# Patient Record
Sex: MALE | Race: OTHER | NOT HISPANIC OR LATINO | URBAN - METROPOLITAN AREA
[De-identification: names, ages, dates, MRNs, and addresses within clinical notes are randomized per-mention and may not be internally consistent; named-entity substitution may affect disease eponyms.]

---

## 2023-01-15 ENCOUNTER — EMERGENCY (EMERGENCY)
Facility: HOSPITAL | Age: 33
LOS: 1 days | Discharge: HOME | End: 2023-01-15
Attending: EMERGENCY MEDICINE | Admitting: EMERGENCY MEDICINE
Payer: MEDICAID

## 2023-01-15 VITALS
TEMPERATURE: 98 F | RESPIRATION RATE: 15 BRPM | HEART RATE: 102 BPM | SYSTOLIC BLOOD PRESSURE: 107 MMHG | DIASTOLIC BLOOD PRESSURE: 73 MMHG | OXYGEN SATURATION: 100 %

## 2023-01-15 PROCEDURE — 99284 EMERGENCY DEPT VISIT MOD MDM: CPT

## 2023-01-15 RX ORDER — FAMOTIDINE 10 MG/ML
20 INJECTION INTRAVENOUS DAILY
Refills: 0 | Status: DISCONTINUED | OUTPATIENT
Start: 2023-01-15 | End: 2023-01-18

## 2023-01-15 RX ORDER — DIPHENHYDRAMINE HCL 50 MG
50 CAPSULE ORAL ONCE
Refills: 0 | Status: COMPLETED | OUTPATIENT
Start: 2023-01-15 | End: 2023-01-15

## 2023-01-15 RX ADMIN — FAMOTIDINE 20 MILLIGRAM(S): 10 INJECTION INTRAVENOUS at 01:14

## 2023-01-15 RX ADMIN — Medication 50 MILLIGRAM(S): at 01:13

## 2023-01-15 NOTE — ED PROVIDER NOTE - PATIENT PORTAL LINK FT
You can access the FollowMyHealth Patient Portal offered by Health system by registering at the following website: [email or web address]. By joining [email or web address]’s FollowMyHealth portal, you will also be able to view your health information using other applications (apps) compatible with our system.

## 2023-01-15 NOTE — ED PROVIDER NOTE - OBJECTIVE STATEMENT
service ([id number]) used to obtain history.   33 M no pmh presenting to ED with diffuse itchy rash x 1 day. Pt states he woke up with the rash this morning. Rash is over torso, arms and legs. Pt took dayquil for sx with mild relief. No history of prior allergic reaction. Denies fever, SOB, chest pain, abdominal pain. Reports no new clothing, detergents, or soaps. States sx have been improving throughout the day. Rash has mostly disappeared, just experiencing itching currently. Pt has unknown vaccination status. Pt from [facility], came to [place] years ago. No recent travel. Was not eating any food before onset of sx.

## 2023-01-15 NOTE — ED ADULT NURSE NOTE - OBJECTIVE STATEMENT
32 y/o M presents to ED room 25 A&Ox4 c/o rash. pt Thai speaking; translation services [id number] used. RN and MD Gayle at bedside. pt woke up at 1900, at 1912 pt felt itching all over body, in throat and nose. denies difficulty breathing, SOB, c/p, N/V/D, fevers. no drooling noted. pt able to speak in full sentences. endorses "pimple rash" all over body. respirations even and unlabored. skin dry and intact; no visible rash noted. 100% on RA. awaiting orders. safety maintained

## 2023-01-15 NOTE — ED PROVIDER NOTE - CLINICAL SUMMARY MEDICAL DECISION MAKING FREE TEXT BOX
33 M presenting with itchy diffuse rash x 1 day. No obvious trigger. No food before onset of sx. No new clothes, detergents, soaps, lotions. No history of prior allergic reactions. Took dayquil with mild relief. Mild tachycardia to 102 in triage, EKG showing sinus tachycardia. Exam showing faint blanching erythematous rash over upper arms and ankles. No concern for anaphylaxis at this time given lack of systemic signs including No SOB, abdominal pain, hypotension.  Likely allergic reaction to unknown trigger. Will give benadryl 50 and famotidine. Reassess.

## 2023-01-15 NOTE — ED PROVIDER NOTE - NSFOLLOWUPINSTRUCTIONS_ED_ALL_ED_FT
No signs of emergency medical condition on today's workup.  Presumptive diagnosis made, but further evaluation may be required by your primary care doctor or specialist for a definitive diagnosis.  Therefore, follow up as directed and if symptoms change/worsen or any emergency conditions, please return to the ER.    Please take an over the counter antihistamine as needed for itching.     Return to the ER if you experience worsening of symptoms, fever, difficulty breathing. Thank you.

## 2023-01-15 NOTE — ED PROVIDER NOTE - ATTENDING CONTRIBUTION TO CARE
33-year-old otherwise healthy male here with 1 day of itching and rash.  Patient reports rash to upper extremities and back.  No preceding injury or illness.  Denies any fevers, cough, chest pain, shortness of breath, abdominal pain, nausea or vomiting.  No known new allergens.  Patient took DayQuil earlier today with mild relief.    Well appearing, lying comfortably in stretcher, awake and alert, nontoxic.  AF, tachy to low 100s, VSS.  Lungs cta bl.  Cards nl S1/S2, tachy reg, no MRG.  Abd soft ntnd.  No focal neuro deficits.  Scattered maculopapular rash to bilateral upper ext and upper back.  Scattered associated excoriations.  No induration, fluctuance, crepitus.      Patient with likely allergic reaction, no associated signs or symptoms to suggest anaphylaxis.  No signs of skin/soft tissue infection.  Will treat symptomatically with antihistamines.  DC home to continue symptomatic care.

## 2023-01-15 NOTE — ED PROVIDER NOTE - NS ED ROS FT
Gen: Denies fevers  CV: Denies chest pain  Skin: +rash  Resp: Denies SOB, cough  Endo: Denies increased urination  GI: Denies nausea, vomiting  Msk: Denies extremity pain  : Denies dysuria  Neuro: Denies LOC  Psych: Denies mood changes  all other ROS negative unless indicated in HPI

## 2024-10-11 NOTE — ED PROVIDER NOTE - PHYSICAL EXAMINATION
room air
Gen: WDWN, NAD  HEENT: EOMI, no nasal discharge, mucous membranes moist. +conjunctival injection b/l   CV: tachycardic, regular rhythm, +S1/S2, no M/R/G, 2+ radial pulses b/l  Resp: CTAB, no W/R/R, no accessory muscle use, no increased work of breathing  GI: Abdomen soft non-distended, NTTP  MSK: No open wounds, no bruising, no LE edema  Skin: +faint blanching diffuse erythematous rash over b/l arms primarily over antecubital fossa and medial upper arms and b/l ankles.   Neuro: A&Ox4, following commands, moving all four extremities spontaneously  Psych: appropriate mood

## 2024-10-22 ENCOUNTER — EMERGENCY (EMERGENCY)
Facility: HOSPITAL | Age: 34
LOS: 1 days | Discharge: HOME | End: 2024-10-22
Attending: EMERGENCY MEDICINE | Admitting: EMERGENCY MEDICINE
Payer: MEDICAID

## 2024-10-22 VITALS
TEMPERATURE: 98 F | DIASTOLIC BLOOD PRESSURE: 78 MMHG | SYSTOLIC BLOOD PRESSURE: 116 MMHG | HEART RATE: 87 BPM | OXYGEN SATURATION: 98 % | RESPIRATION RATE: 18 BRPM

## 2024-10-22 LAB
ALBUMIN SERPL ELPH-MCNC: 4.4 G/DL — SIGNIFICANT CHANGE UP (ref 3.3–5)
ALP SERPL-CCNC: 117 U/L — SIGNIFICANT CHANGE UP (ref 40–120)
ALT FLD-CCNC: 45 U/L — HIGH (ref 4–41)
ANION GAP SERPL CALC-SCNC: 13 MMOL/L — SIGNIFICANT CHANGE UP (ref 7–14)
AST SERPL-CCNC: 30 U/L — SIGNIFICANT CHANGE UP (ref 4–40)
BASOPHILS # BLD AUTO: 0.06 K/UL — SIGNIFICANT CHANGE UP (ref 0–0.2)
BASOPHILS NFR BLD AUTO: 0.6 % — SIGNIFICANT CHANGE UP (ref 0–2)
BILIRUB SERPL-MCNC: 0.6 MG/DL — SIGNIFICANT CHANGE UP (ref 0.2–1.2)
BUN SERPL-MCNC: 9 MG/DL — SIGNIFICANT CHANGE UP (ref 7–23)
CALCIUM SERPL-MCNC: 9.2 MG/DL — SIGNIFICANT CHANGE UP (ref 8.4–10.5)
CHLORIDE SERPL-SCNC: 106 MMOL/L — SIGNIFICANT CHANGE UP (ref 98–107)
CO2 SERPL-SCNC: 22 MMOL/L — SIGNIFICANT CHANGE UP (ref 22–31)
CREAT SERPL-MCNC: 1.03 MG/DL — SIGNIFICANT CHANGE UP (ref 0.5–1.3)
EGFR: 97 ML/MIN/1.73M2 — SIGNIFICANT CHANGE UP
EOSINOPHIL # BLD AUTO: 0.89 K/UL — HIGH (ref 0–0.5)
EOSINOPHIL NFR BLD AUTO: 9.6 % — HIGH (ref 0–6)
GLUCOSE SERPL-MCNC: 86 MG/DL — SIGNIFICANT CHANGE UP (ref 70–99)
HCT VFR BLD CALC: 48 % — SIGNIFICANT CHANGE UP (ref 39–50)
HGB BLD-MCNC: 15.3 G/DL — SIGNIFICANT CHANGE UP (ref 13–17)
IANC: 4.32 K/UL — SIGNIFICANT CHANGE UP (ref 1.8–7.4)
IMM GRANULOCYTES NFR BLD AUTO: 0.4 % — SIGNIFICANT CHANGE UP (ref 0–0.9)
LIDOCAIN IGE QN: 18 U/L — SIGNIFICANT CHANGE UP (ref 7–60)
LYMPHOCYTES # BLD AUTO: 3.43 K/UL — HIGH (ref 1–3.3)
LYMPHOCYTES # BLD AUTO: 37 % — SIGNIFICANT CHANGE UP (ref 13–44)
MCHC RBC-ENTMCNC: 22.5 PG — LOW (ref 27–34)
MCHC RBC-ENTMCNC: 31.9 GM/DL — LOW (ref 32–36)
MCV RBC AUTO: 70.6 FL — LOW (ref 80–100)
MONOCYTES # BLD AUTO: 0.54 K/UL — SIGNIFICANT CHANGE UP (ref 0–0.9)
MONOCYTES NFR BLD AUTO: 5.8 % — SIGNIFICANT CHANGE UP (ref 2–14)
NEUTROPHILS # BLD AUTO: 4.32 K/UL — SIGNIFICANT CHANGE UP (ref 1.8–7.4)
NEUTROPHILS NFR BLD AUTO: 46.6 % — SIGNIFICANT CHANGE UP (ref 43–77)
NRBC # BLD: 0 /100 WBCS — SIGNIFICANT CHANGE UP (ref 0–0)
NRBC # FLD: 0 K/UL — SIGNIFICANT CHANGE UP (ref 0–0)
PLATELET # BLD AUTO: 270 K/UL — SIGNIFICANT CHANGE UP (ref 150–400)
POTASSIUM SERPL-MCNC: 4.3 MMOL/L — SIGNIFICANT CHANGE UP (ref 3.5–5.3)
POTASSIUM SERPL-SCNC: 4.3 MMOL/L — SIGNIFICANT CHANGE UP (ref 3.5–5.3)
PROT SERPL-MCNC: 7.3 G/DL — SIGNIFICANT CHANGE UP (ref 6–8.3)
RBC # BLD: 6.8 M/UL — HIGH (ref 4.2–5.8)
RBC # FLD: 17 % — HIGH (ref 10.3–14.5)
SODIUM SERPL-SCNC: 141 MMOL/L — SIGNIFICANT CHANGE UP (ref 135–145)
TROPONIN T, HIGH SENSITIVITY RESULT: <6 NG/L — SIGNIFICANT CHANGE UP
WBC # BLD: 9.28 K/UL — SIGNIFICANT CHANGE UP (ref 3.8–10.5)
WBC # FLD AUTO: 9.28 K/UL — SIGNIFICANT CHANGE UP (ref 3.8–10.5)

## 2024-10-22 PROCEDURE — 93010 ELECTROCARDIOGRAM REPORT: CPT

## 2024-10-22 PROCEDURE — 99285 EMERGENCY DEPT VISIT HI MDM: CPT

## 2024-10-22 RX ORDER — PANTOPRAZOLE SODIUM 40 MG/1
40 TABLET, DELAYED RELEASE ORAL ONCE
Refills: 0 | Status: COMPLETED | OUTPATIENT
Start: 2024-10-22 | End: 2024-10-22

## 2024-10-22 RX ORDER — MAG HYDROX/ALUMINUM HYD/SIMETH 200-200-20
30 SUSPENSION, ORAL (FINAL DOSE FORM) ORAL ONCE
Refills: 0 | Status: COMPLETED | OUTPATIENT
Start: 2024-10-22 | End: 2024-10-22

## 2024-10-22 RX ORDER — METOCLOPRAMIDE HCL 5 MG
10 TABLET ORAL ONCE
Refills: 0 | Status: COMPLETED | OUTPATIENT
Start: 2024-10-22 | End: 2024-10-22

## 2024-10-22 RX ORDER — SODIUM CHLORIDE 0.9 % (FLUSH) 0.9 %
1000 SYRINGE (ML) INJECTION ONCE
Refills: 0 | Status: COMPLETED | OUTPATIENT
Start: 2024-10-22 | End: 2024-10-22

## 2024-10-22 RX ORDER — METOCLOPRAMIDE HCL 5 MG
10 TABLET ORAL ONCE
Refills: 0 | Status: DISCONTINUED | OUTPATIENT
Start: 2024-10-22 | End: 2024-10-22

## 2024-10-22 RX ORDER — LIDOCAINE 50 MG/G
10 CREAM TOPICAL ONCE
Refills: 0 | Status: COMPLETED | OUTPATIENT
Start: 2024-10-22 | End: 2024-10-22

## 2024-10-22 RX ORDER — METOCLOPRAMIDE HCL 5 MG
1 TABLET ORAL
Qty: 28 | Refills: 0
Start: 2024-10-22 | End: 2024-10-28

## 2024-10-22 RX ADMIN — Medication 1000 MILLILITER(S): at 19:20

## 2024-10-22 RX ADMIN — Medication 1000 MILLILITER(S): at 18:20

## 2024-10-22 RX ADMIN — PANTOPRAZOLE SODIUM 40 MILLIGRAM(S): 40 TABLET, DELAYED RELEASE ORAL at 18:20

## 2024-10-22 RX ADMIN — Medication 30 MILLILITER(S): at 18:20

## 2024-10-22 RX ADMIN — LIDOCAINE 10 MILLILITER(S): 50 CREAM TOPICAL at 18:20

## 2024-10-22 RX ADMIN — Medication 10 MILLIGRAM(S): at 20:01

## 2024-10-22 NOTE — ED ADULT TRIAGE NOTE - SPO2 (%)
Patient presents today for a post op exam s/p robotic right inguinal hernia repair DOS 3/1/22 final check. No concerns today. Eating and drinking without difficulty and no issues with BMs or pain    LOV 3/14/22    Denies any known latex allergies or symptoms of latex sensitivity      Allergies reviewed and updated    Medications reviewed and updated    Smoking history reviewed    No vital signs needed per MD       98

## 2024-10-22 NOTE — ED PROVIDER NOTE - OBJECTIVE STATEMENT
The patient is a 34y Male who has a past medical and surgery history of  ? kidney problem PTED c/o several days/weeks of epigastric pain, and left  flank pain; denied diarrhea at bedside despite triage  c/o diarrhea. No N/V HE/BRPR weight loss + YUDI no UTI s/s  no sick contacts

## 2024-10-22 NOTE — ED ADULT TRIAGE NOTE - CHIEF COMPLAINT QUOTE
Pt c/o epigastric pain, R flank pain x  1 week. PT c/o diarrhea. Pt states "I have a problem with my kidney." Denies N/V. Denies any past medical Hx.

## 2024-10-22 NOTE — ED ADULT NURSE NOTE - OBJECTIVE STATEMENT
Received patient in Intake 1 c/o abdominal pain, Patient is A&OX4, ambulatory, airway patent, breathing unlabored and even, radial pulses palpable, abdomen soft, tender. Labs obtained, 20G IV placed on right arm, medications given as ordered, IV fluid bolus infusing. Side rails up and safety maintained. Call bells within reach. Family at the bedside. Received patient in Intake 1 c/o abdominal pain and diarrhea. Patient denies SOB, chest pain, fever, Patient is A&OX4, ambulatory, airway patent, breathing unlabored and even, radial pulses palpable, abdomen soft, tender. Labs obtained, 20G IV placed on right arm, medications given as ordered, IV fluid bolus infusing. Side rails up and safety maintained. Call bells within reach. Family at the bedside.

## 2024-10-22 NOTE — ED PROVIDER NOTE - CLINICAL SUMMARY MEDICAL DECISION MAKING FREE TEXT BOX
The patient is a 34y Male who has a past medical and surgery history of  ? kidney problem PTED c/o several days/weeks of epigastric pain, and left  flank pain; denied diarrhea at bedside despite triage  c/o diarrhea. No N/V HE/BRPR weight loss + YUDI no UTI s/s  no sick contacts   abdominal pain   Vital Signs Last 24 Hrs  T(F): 98.4 HR: 87 BP: 116/78 RR: 18 SpO2: 98% (22 Oct 2024 16:22) (98% - 98%)    PE: as described; +epigastic tenderness no CVAT     DATA:  EKG: Normal; NSR@  LAB:                         15.3   9.28  )-----------( 270      ( 22 Oct 2024 18:58 )             48.0   Mean Cell Volume: 70.6 fL (10-22-24 @ 18:58)  Auto Neutrophil %: 46.6 % (10-22-24 @ 18:58)  Auto Eosinophil %: 9.6 % (10-22-24 @ 18:58)  10-22    141  |  106  |  9   ----------------------------<  86  4.3   |  22  |  1.03    Ca    9.2      22 Oct 2024 18:58    TPro  7.3  /  Alb  4.4  /  TBili  0.6  /  DBili  x   /  AST  30  /  ALT  45[H]  /  AlkPhos  117  10-22  Lipase: 18 U/L (10-22-24 @ 18:58)  Lipase: 18 U/L (10-22-24 @ 18:58)     Troponin T, High Sensitivity Result: <6 ng/L (10-22-24 @ 18:58)  Urinalysis Basic - ( 22 Oct 2024 18:58 )    Color: x / Appearance: x / SG: x / pH: x  Gluc: 86 mg/dL / Ketone: x  / Bili: x / Urobili: x   Blood: x / Protein: x / Nitrite: x   Leuk Esterase: x / RBC: x / WBC x   Sq Epi: x / Non Sq Epi: x / Bacteria: x    IMPRESSION/RISK:  Dx=  Differential includes but not limited to conditions listed in order of most possible first:   Consideration include  Plan  metoclopramide Injectable 10 milliGRAM(s) IV Push

## 2024-10-22 NOTE — ED PROVIDER NOTE - PATIENT PORTAL LINK FT
You can access the FollowMyHealth Patient Portal offered by [facility] by registering at the following website: [email or web address]. By joining [employer]’s FollowMyHealth portal, you will also be able to view your health information using other applications (apps) compatible with our system.

## 2024-10-23 PROBLEM — Z78.9 OTHER SPECIFIED HEALTH STATUS: Chronic | Status: ACTIVE | Noted: 2023-01-15
